# Patient Record
Sex: MALE | Race: WHITE | NOT HISPANIC OR LATINO | Employment: FULL TIME | URBAN - METROPOLITAN AREA
[De-identification: names, ages, dates, MRNs, and addresses within clinical notes are randomized per-mention and may not be internally consistent; named-entity substitution may affect disease eponyms.]

---

## 2017-03-14 ENCOUNTER — OFFICE VISIT (OUTPATIENT)
Dept: URGENT CARE | Facility: CLINIC | Age: 37
End: 2017-03-14
Payer: COMMERCIAL

## 2017-03-14 VITALS
DIASTOLIC BLOOD PRESSURE: 80 MMHG | OXYGEN SATURATION: 98 % | SYSTOLIC BLOOD PRESSURE: 110 MMHG | HEART RATE: 86 BPM | WEIGHT: 141.8 LBS | BODY MASS INDEX: 21 KG/M2 | TEMPERATURE: 99 F | RESPIRATION RATE: 16 BRPM | HEIGHT: 69 IN

## 2017-03-14 DIAGNOSIS — R05.9 COUGH: ICD-10-CM

## 2017-03-14 DIAGNOSIS — J06.9 UPPER RESPIRATORY TRACT INFECTION, UNSPECIFIED TYPE: ICD-10-CM

## 2017-03-14 PROCEDURE — 99214 OFFICE O/P EST MOD 30 MIN: CPT | Performed by: PHYSICIAN ASSISTANT

## 2017-03-14 RX ORDER — AMOXICILLIN AND CLAVULANATE POTASSIUM 875; 125 MG/1; MG/1
1 TABLET, FILM COATED ORAL 2 TIMES DAILY
Qty: 20 TAB | Refills: 0 | Status: SHIPPED | OUTPATIENT
Start: 2017-03-14 | End: 2017-03-24

## 2017-03-14 ASSESSMENT — ENCOUNTER SYMPTOMS
COUGH: 0
ABDOMINAL PAIN: 0
WHEEZING: 0
VOMITING: 0
HEADACHES: 1
CHILLS: 0
SHORTNESS OF BREATH: 0
SPUTUM PRODUCTION: 0
DIARRHEA: 0
NAUSEA: 0
FEVER: 0
SORE THROAT: 1

## 2017-03-14 NOTE — MR AVS SNAPSHOT
"        Kush iJn   3/14/2017 1:00 PM   Office Visit   MRN: [id number]    Department:  [facility]   Dept Phone:  [phone]    Description:  Male : 1980   Provider:  Tomasz Kaminski PA-C           Reason for Visit     Sinus Problem x4days, sinus pressure, mucus, fever, chills, headache, body aches      Allergies as of 3/14/2017     No Known Allergies      You were diagnosed with     Cough   [786.2.ICD-9-CM]         Vital Signs     Blood Pressure Pulse Temperature Respirations Height Weight    110/80 mmHg 86 37.2 °C (99 °F) 16 1.753 m (5' 9.02\") 64.32 kg (141 lb 12.8 oz)    Body Mass Index Oxygen Saturation Smoking Status             20.93 kg/m2 98% Never Smoker          Basic Information     Date Of Birth Sex Race Ethnicity Preferred Language    1980 Male White Non- English      Health Maintenance        Date Due Completion Dates    IMM DTaP/Tdap/Td Vaccine (1 - Tdap) 2000 ---    IMM INFLUENZA (1) 2016 ---            Current Immunizations     No immunizations on file.      Below and/or attached are the medications your provider expects you to take. Review all of your home medications and newly ordered medications with your provider and/or pharmacist. Follow medication instructions as directed by your provider and/or pharmacist. Please keep your medication list with you and share with your provider. Update the information when medications are discontinued, doses are changed, or new medications (including over-the-counter products) are added; and carry medication information at all times in the event of emergency situations     Allergies:  No Known Allergies          Medications  Valid as of: 2017 -  1:37 PM    Generic Name Brand Name Tablet Size Instructions for use    Amoxicillin-Pot Clavulanate (Tab) AUGMENTIN 875-125 MG Take 1 Tab by mouth every 12 hours. Take with food.        Amoxicillin-Pot Clavulanate (Tab) AUGMENTIN 875-125 MG Take 1 Tab by " mouth 2 times a day for 10 days.        Azithromycin (Tab) ZITHROMAX 250 MG 2 tabs by mouth day 1, 1 tab by mouth days 2-5        GuaiFENesin (TABLET SR 12 HR) Guaifenesin 1200 MG Take 1 Tab by mouth 2 times a day.        Hydrocod Polst-Chlorphen Polst (Liquid CR) TUSSIONEX 10-8 MG/5ML Take 5 mL by mouth every 12 hours.        MethylPREDNISolone (Tab) MEDROL DOSPACK 4 MG Use taper dose maisha as directed        Mometasone Furoate (Suspension) NASONEX 50 MCG/ACT Spray 2 Sprays in nose every day. Two sprays each nostril once daily        Oxymetazoline HCl   Spray  in nose.        Phenylephrine-APAP-Guaifenesin   Take  by mouth.        .                 Medicines prescribed today were sent to:     [facility] #[id number] - [facility] [place] - [address]    [address] [place] [address]    Phone: [phone] Fax: [phone]    Open 24 Hours?: No      Medication refill instructions:       If your prescription bottle indicates you have medication refills left, it is not necessary to call your provider’s office. Please contact your pharmacy and they will refill your medication.    If your prescription bottle indicates you do not have any refills left, you may request refills at any time through one of the following ways: The online [employer] system (except Urgent Care), by calling your provider’s office, or by asking your pharmacy to contact your provider’s office with a refill request. Medication refills are processed only during regular business hours and may not be available until the next business day. Your provider may request additional information or to have a follow-up visit with you prior to refilling your medication.   *Please Note: Medication refills are assigned a new Rx number when refilled electronically. Your pharmacy may indicate that no refills were authorized even though a new prescription for the same medication is available at the pharmacy. Please request the medicine by name with the  pharmacy before contacting your provider for a refill.           3 day Blinds Access Code: [email or web address]  Expires: 4/13/2017  1:37 PM    3 day Blinds  A secure, online tool to manage your health information     [employer] 3 day Blinds® is a secure, online tool that connects you to your personalized health information from the privacy of your home -- day or night - making it very easy for you to manage your healthcare. Once the activation process is completed, you can even access your medical information using the 3 day Blinds yessica, which is available for free in the Apple Yessica store or Google Play store.     3 day Blinds provides the following levels of access (as shown below):   My Chart Features   [facility] Primary Care Doctor [facility]  Specialists [facility]  Urgent  Care [facility]  Primary Care  Doctor   Email your healthcare team securely and privately 24/7 X X X    Manage appointments: schedule your next appointment; view details of past/upcoming appointments X      Request prescription refills. X      View recent personal medical records, including lab and immunizations X X X X   View health record, including health history, allergies, medications X X X X   Read reports about your outpatient visits, procedures, consult and ER notes X X X X   See your discharge summary, which is a recap of your hospital and/or ER visit that includes your diagnosis, lab results, and care plan. X X       How to register for 3 day Blinds:  1. Go to  [email or web address].  2. Click on the Sign Up Now box, which takes you to the New Member Sign Up page. You will need to provide the following information:  a. Enter your 3 day Blinds Access Code exactly as it appears at the top of this page. (You will not need to use this code after you’ve completed the sign-up process. If you do not sign up before the expiration date, you must request a new code.)   b. Enter your date of birth.   c. Enter your home email address.   d. Click Submit, and follow the next screen’s  instructions.  3. Create a Seeqt ID. This will be your Seeqt login ID and cannot be changed, so think of one that is secure and easy to remember.  4. Create a Seeqt password. You can change your password at any time.  5. Enter your Password Reset Question and Answer. This can be used at a later time if you forget your password.   6. Enter your e-mail address. This allows you to receive e-mail notifications when new information is available in [employer].  7. Click Sign Up. You can now view your health information.    For assistance activating your [employer]Parenting account, call [phone]

## 2017-03-14 NOTE — PROGRESS NOTES
Subjective:      Kush Jin is a 37 y.o. male who presents with Sinus Problem            Sinus Problem  Associated symptoms include congestion (sinus press to eyes), headaches and a sore throat ( mild). Pertinent negatives include no chills, coughing, ear pain or shortness of breath.   4d of sinus press, fever/chills, myalgias, denies cough, last 24hr mild dry cough, c/o sorethroat - worse at night and in am, c/o ear press, c/o HA, eye press, PMH of sinusitis, denies nausea/vomiting/abdpain/diarrhea/rash. Childhood asthma, denies wheeze, remote pMH of bronchitis/strep, pneumonia ~10yrs ago. seasonal allerg. No flu shot.    Review of Systems   Constitutional: Negative for fever ( resolved) and chills.   HENT: Positive for congestion (sinus press to eyes) and sore throat ( mild). Negative for ear pain.    Respiratory: Negative for cough, sputum production, shortness of breath and wheezing.    Gastrointestinal: Negative for nausea, vomiting, abdominal pain and diarrhea.   Skin: Negative for rash.   Neurological: Positive for headaches.   Endo/Heme/Allergies: Positive for environmental allergies.       PMH:  has no past medical history of Diabetes, ASTHMA, GERD (gastroesophageal reflux disease), Meningitis, Migraine, or Urinary tract infection, site not specified.  MEDS:   Current outpatient prescriptions:   •  Phenylephrine-APAP-Guaifenesin (MUCINEX FAST-MAX COLD & SINUS PO), Take  by mouth., Disp: , Rfl:   •  methylPREDNISolone (MEDROL DOSPACK) 4 MG TABS, Use taper dose maisha as directed, Disp: 21 Tab, Rfl: 0  •  hydrocod polst-chlorphen polst (TUSSIONEX) 10-8 MG/5ML LQCR, Take 5 mL by mouth every 12 hours., Disp: 140 mL, Rfl: 0  •  Oxymetazoline HCl (AFRIN 12 HOUR NA), Spray  in nose., Disp: , Rfl:   •  azithromycin (ZITHROMAX) 250 MG TABS, 2 tabs by mouth day 1, 1 tab by mouth days 2-5, Disp: 6 Tab, Rfl: 0  •  Guaifenesin (MUCINEX MAXIMUM STRENGTH) 1200 MG TB12, Take 1 Tab by mouth 2 times a day., Disp: 15  "Tab, Rfl: 1  •  amoxicillin-clavulanate (AUGMENTIN) 875-125 MG TABS, Take 1 Tab by mouth every 12 hours. Take with food., Disp: 20 Each, Rfl: 0  •  mometasone (NASONEX) 50 MCG/ACT nasal spray, Spray 2 Sprays in nose every day. Two sprays each nostril once daily, Disp: 1 Inhaler, Rfl: 0  ALLERGIES: No Known Allergies  SURGHX: No past surgical history on file.  SOCHX:  reports that he has never smoked. He does not have any smokeless tobacco history on file.  FH: Family history was reviewed, no pertinent findings to report    I have worn a mask for the entire encounter with this patient.      Objective:     /80 mmHg  Pulse 86  Temp(Src) 37.2 °C (99 °F)  Resp 16  Ht 1.753 m (5' 9.02\")  Wt 64.32 kg (141 lb 12.8 oz)  BMI 20.93 kg/m2  SpO2 98%     Physical Exam   Constitutional: He is oriented to person, place, and time. He appears well-developed and well-nourished. No distress.   HENT:   Head: Normocephalic and atraumatic.   Right Ear: External ear and ear canal normal. Tympanic membrane is bulging. Tympanic membrane is not erythematous.   Left Ear: External ear and ear canal normal. Tympanic membrane is bulging. Tympanic membrane is not erythematous.   Nose: Right sinus exhibits no maxillary sinus tenderness and no frontal sinus tenderness. Left sinus exhibits no maxillary sinus tenderness and no frontal sinus tenderness.   Mouth/Throat: Uvula is midline and mucous membranes are normal. Posterior oropharyngeal erythema ( PND) present. No oropharyngeal exudate, posterior oropharyngeal edema or tonsillar abscesses.   Eyes: Conjunctivae are normal. Right eye exhibits no discharge. Left eye exhibits no discharge. No scleral icterus.   Neck: Neck supple.   Pulmonary/Chest: Effort normal and breath sounds normal. No respiratory distress. He has no decreased breath sounds. He has no wheezes. He has no rhonchi. He has no rales.   Musculoskeletal: Normal range of motion.   Neurological: He is alert and oriented to " person, place, and time. Coordination normal.   Skin: Skin is warm and dry. He is not diaphoretic. No pallor.   Psychiatric: He has a normal mood and affect.   Nursing note and vitals reviewed.              Assessment/Plan:     1. Upper respiratory tract infection, unspecified type  Supportive care is reviewed with patient/caregiver - recommend to push PO fluids and electrolytes, Nsaids/tylenol, netti pot/saline irrig, humidifier in home, flonase, ponaris, antihistamines, we discuss likely viral URI at this point and focus on supportive/symptom care, with pt's hx of eye pressure w/ sinusitis will send w/ contingent  Contingent antibiotic prescription given to patient to fill upon meeting criteria of guidelines discussed.   If filling,  take full course of Rx, take with probiotics, observe for resolution  Return to clinic with lack of resolution or progression of symptoms.      2. Cough    - amoxicillin-clavulanate (AUGMENTIN) 875-125 MG Tab; Take 1 Tab by mouth 2 times a day for 10 days.  Dispense: 20 Tab; Refill: 0

## 2020-03-13 ENCOUNTER — APPOINTMENT (OUTPATIENT)
Dept: RADIOLOGY | Facility: MEDICAL CENTER | Age: 40
End: 2020-03-13
Attending: EMERGENCY MEDICINE
Payer: COMMERCIAL

## 2020-03-13 ENCOUNTER — HOSPITAL ENCOUNTER (EMERGENCY)
Facility: MEDICAL CENTER | Age: 40
End: 2020-03-13
Attending: EMERGENCY MEDICINE
Payer: COMMERCIAL

## 2020-03-13 VITALS
HEART RATE: 75 BPM | DIASTOLIC BLOOD PRESSURE: 90 MMHG | TEMPERATURE: 98.6 F | RESPIRATION RATE: 18 BRPM | BODY MASS INDEX: 20.77 KG/M2 | HEIGHT: 69 IN | WEIGHT: 140.21 LBS | SYSTOLIC BLOOD PRESSURE: 126 MMHG | OXYGEN SATURATION: 96 %

## 2020-03-13 DIAGNOSIS — R07.9 ACUTE CHEST PAIN: ICD-10-CM

## 2020-03-13 LAB
ANION GAP SERPL CALC-SCNC: 11 MMOL/L (ref 7–16)
BASOPHILS # BLD AUTO: 0.4 % (ref 0–1.8)
BASOPHILS # BLD: 0.02 K/UL (ref 0–0.12)
BUN SERPL-MCNC: 11 MG/DL (ref 8–22)
CALCIUM SERPL-MCNC: 9.4 MG/DL (ref 8.4–10.2)
CHLORIDE SERPL-SCNC: 103 MMOL/L (ref 96–112)
CO2 SERPL-SCNC: 27 MMOL/L (ref 20–33)
CREAT SERPL-MCNC: 0.81 MG/DL (ref 0.5–1.4)
EKG IMPRESSION: NORMAL
EOSINOPHIL # BLD AUTO: 0.04 K/UL (ref 0–0.51)
EOSINOPHIL NFR BLD: 0.9 % (ref 0–6.9)
ERYTHROCYTE [DISTWIDTH] IN BLOOD BY AUTOMATED COUNT: 36.4 FL (ref 35.9–50)
GLUCOSE SERPL-MCNC: 106 MG/DL (ref 65–99)
HCT VFR BLD AUTO: 49 % (ref 42–52)
HGB BLD-MCNC: 17.2 G/DL (ref 14–18)
IMM GRANULOCYTES # BLD AUTO: 0.01 K/UL (ref 0–0.11)
IMM GRANULOCYTES NFR BLD AUTO: 0.2 % (ref 0–0.9)
LYMPHOCYTES # BLD AUTO: 1.29 K/UL (ref 1–4.8)
LYMPHOCYTES NFR BLD: 28.5 % (ref 22–41)
MCH RBC QN AUTO: 30.8 PG (ref 27–33)
MCHC RBC AUTO-ENTMCNC: 35.1 G/DL (ref 33.7–35.3)
MCV RBC AUTO: 87.8 FL (ref 81.4–97.8)
MONOCYTES # BLD AUTO: 0.51 K/UL (ref 0–0.85)
MONOCYTES NFR BLD AUTO: 11.3 % (ref 0–13.4)
NEUTROPHILS # BLD AUTO: 2.65 K/UL (ref 1.82–7.42)
NEUTROPHILS NFR BLD: 58.7 % (ref 44–72)
NRBC # BLD AUTO: 0 K/UL
NRBC BLD-RTO: 0 /100 WBC
PLATELET # BLD AUTO: 196 K/UL (ref 164–446)
PMV BLD AUTO: 10.1 FL (ref 9–12.9)
POTASSIUM SERPL-SCNC: 4.2 MMOL/L (ref 3.6–5.5)
RBC # BLD AUTO: 5.58 M/UL (ref 4.7–6.1)
SODIUM SERPL-SCNC: 141 MMOL/L (ref 135–145)
TROPONIN T SERPL-MCNC: <6 NG/L (ref 6–19)
WBC # BLD AUTO: 4.5 K/UL (ref 4.8–10.8)

## 2020-03-13 PROCEDURE — 93005 ELECTROCARDIOGRAM TRACING: CPT | Performed by: EMERGENCY MEDICINE

## 2020-03-13 PROCEDURE — 71046 X-RAY EXAM CHEST 2 VIEWS: CPT

## 2020-03-13 PROCEDURE — 80048 BASIC METABOLIC PNL TOTAL CA: CPT

## 2020-03-13 PROCEDURE — 85025 COMPLETE CBC W/AUTO DIFF WBC: CPT

## 2020-03-13 PROCEDURE — 36415 COLL VENOUS BLD VENIPUNCTURE: CPT

## 2020-03-13 PROCEDURE — 99284 EMERGENCY DEPT VISIT MOD MDM: CPT

## 2020-03-13 PROCEDURE — 84484 ASSAY OF TROPONIN QUANT: CPT

## 2020-03-13 PROCEDURE — 93005 ELECTROCARDIOGRAM TRACING: CPT

## 2020-03-13 NOTE — ED PROVIDER NOTES
ED Provider Note    CHIEF COMPLAINT  Chief Complaint   Patient presents with   • Chest Pain     chest pain x 2 weeks  Fatigue x one week        HPI  Kush Jin is a 40 y.o. male who presents to the emergency department with a chief complaint of chest pain.  The patient localizes the pain to the left side of the chest.  He feels like someone is poking him with a pen.  The pain comes on intermittently.  Last about 30 to 45 seconds.  There are no alleviating or exacerbating factors.  No other associated symptoms.  No cough, no fevers, no hemoptysis.  No leg pain or swelling.    REVIEW OF SYSTEMS  See HPI for further details. All other systems are negative.     PAST MEDICAL HISTORY  History reviewed. No pertinent past medical history.    FAMILY HISTORY  History reviewed. No pertinent family history.    SOCIAL HISTORY  Social History     Socioeconomic History   • Marital status:      Spouse name: Not on file   • Number of children: Not on file   • Years of education: Not on file   • Highest education level: Not on file   Occupational History   • Not on file   Social Needs   • Financial resource strain: Not on file   • Food insecurity     Worry: Not on file     Inability: Not on file   • Transportation needs     Medical: Not on file     Non-medical: Not on file   Tobacco Use   • Smoking status: Never Smoker   Substance and Sexual Activity   • Alcohol use: Yes   • Drug use: Never   • Sexual activity: Not on file   Lifestyle   • Physical activity     Days per week: Not on file     Minutes per session: Not on file   • Stress: Not on file   Relationships   • Social connections     Talks on phone: Not on file     Gets together: Not on file     Attends Latter day service: Not on file     Active member of club or organization: Not on file     Attends meetings of clubs or organizations: Not on file     Relationship status: Not on file   • Intimate partner violence     Fear of current or ex partner: Not on file  "    Emotionally abused: Not on file     Physically abused: Not on file     Forced sexual activity: Not on file   Other Topics Concern   • Not on file   Social History Narrative   • Not on file       SURGICAL HISTORY  History reviewed. No pertinent surgical history.    CURRENT MEDICATIONS  Home Medications     Reviewed by Tish Cruz (Pharmacy Tech) on 03/13/20 at 1541  Med List Status: Complete   Medication Last Dose Status        Patient Miguel Taking any Medications                       ALLERGIES  No Known Allergies    PHYSICAL EXAM  VITAL SIGNS: /86   Pulse 76   Temp 37.2 °C (98.9 °F) (Temporal)   Resp 19   Ht 1.753 m (5' 9\")   Wt 63.6 kg (140 lb 3.4 oz)   SpO2 95%   BMI 20.71 kg/m²    Constitutional: Well developed, Well nourished, No acute distress, Non-toxic appearance.   HENT: Normocephalic, Atraumatic, Bilateral external ears normal, Oropharynx moist, No oral exudates, Nose normal.   Eyes: PERRLA, EOMI, Conjunctiva normal, No discharge.   Neck: Normal range of motion, No tenderness, Supple, No stridor.   Cardiovascular: Regular rate and rhythm, no audible murmur  Thorax & Lungs: Clear to auscultation bilaterally.  No rales, rhonchi, or wheezing.  Abdomen: Bowel sounds normal, Soft, No tenderness, No masses, No pulsatile masses.   Skin: Warm, Dry, No erythema, No rash.   Back: No tenderness, No CVA tenderness.   Extremities: Intact distal pulses, No tenderness, No cyanosis, No clubbing.  No edema.  No calf tenderness.  Neurologic: Alert & oriented x 3, Normal motor function, Normal sensory function, No focal deficits noted.     EKG      RADIOLOGY/PROCEDURES  DX-CHEST-2 VIEWS   Final Result      Negative two views of the chest.      DX-CHEST-2 VIEWS   Final Result   Addendum 1 of 1   I was just notified by the x-ray technologist that the chest x-ray    obtained and listed under this patient's medical record number and name is    from a different person.      Therefore, disregard the " above report and this patient will be imaged and    a report generated for those images      Findings were discussed with MINA MOBLEY on 3/13/2020 4:26 PM.      Final        Results for orders placed or performed during the hospital encounter of 03/13/20   CBC WITH DIFFERENTIAL   Result Value Ref Range    WBC 4.5 (L) 4.8 - 10.8 K/uL    RBC 5.58 4.70 - 6.10 M/uL    Hemoglobin 17.2 14.0 - 18.0 g/dL    Hematocrit 49.0 42.0 - 52.0 %    MCV 87.8 81.4 - 97.8 fL    MCH 30.8 27.0 - 33.0 pg    MCHC 35.1 33.7 - 35.3 g/dL    RDW 36.4 35.9 - 50.0 fL    Platelet Count 196 164 - 446 K/uL    MPV 10.1 9.0 - 12.9 fL    Neutrophils-Polys 58.70 44.00 - 72.00 %    Lymphocytes 28.50 22.00 - 41.00 %    Monocytes 11.30 0.00 - 13.40 %    Eosinophils 0.90 0.00 - 6.90 %    Basophils 0.40 0.00 - 1.80 %    Immature Granulocytes 0.20 0.00 - 0.90 %    Nucleated RBC 0.00 /100 WBC    Neutrophils (Absolute) 2.65 1.82 - 7.42 K/uL    Lymphs (Absolute) 1.29 1.00 - 4.80 K/uL    Monos (Absolute) 0.51 0.00 - 0.85 K/uL    Eos (Absolute) 0.04 0.00 - 0.51 K/uL    Baso (Absolute) 0.02 0.00 - 0.12 K/uL    Immature Granulocytes (abs) 0.01 0.00 - 0.11 K/uL    NRBC (Absolute) 0.00 K/uL   BASIC METABOLIC PANEL   Result Value Ref Range    Sodium 141 135 - 145 mmol/L    Potassium 4.2 3.6 - 5.5 mmol/L    Chloride 103 96 - 112 mmol/L    Co2 27 20 - 33 mmol/L    Glucose 106 (H) 65 - 99 mg/dL    Bun 11 8 - 22 mg/dL    Creatinine 0.81 0.50 - 1.40 mg/dL    Calcium 9.4 8.4 - 10.2 mg/dL    Anion Gap 11.0 7.0 - 16.0   TROPONIN   Result Value Ref Range    Troponin T <6 6 - 19 ng/L   ESTIMATED GFR   Result Value Ref Range    GFR If African American >60 >60 mL/min/1.73 m 2    GFR If Non African American >60 >60 mL/min/1.73 m 2   EKG   Result Value Ref Range    Report       [facility] Emergency Dept.    Test Date:  2020-03-13  Pt Name:    LADI HICKSOWELL             Department: E[facility]  MRN:        [id number]                      Room:  Gender:     Male                          Technician: BOB  :        1980                   Requested By:ER TRIAGE PROTOCOL  Order #:    [id number]                    Reading MD: MINA MOBLEY MD    Measurements  Intervals                                Axis  Rate:       69                           P:          40  MT:         181                          QRS:        40  QRSD:       80                           T:          42  QT:         367  QTc:        393    Interpretive Statements  Sinus rhythm  No previous ECG available for comparison  Electronically Signed On 3- 14:27:10 PDT by MINA MOBLEY MD           COURSE & MEDICAL DECISION MAKING  Pertinent Labs & Imaging studies reviewed. (See chart for details)    Patient presents today with left-sided chest pain.  Atypical chest pain.  Fairly nonspecific.  EKG is normal.  Chest x-ray is normal.  Laboratory findings are remarkable for normal white blood cell count.  Normal troponin.    Patient presents today with an atypical nonspecific chest pain syndrome.  Emergency department evaluation is reassuring.  Likely mechanical chest wall pain.  Recommended nonsteroidal over-the-counter anti-inflammatories for pain control.  Discharged home in stable condition.  Primary care follow-up.    The patient will return for new or worsening symptoms and is stable at the time of discharge.    The patient is referred to a primary physician for blood pressure management, diabetic screening, and for all other preventative health concerns.    DISPOSITION:  Patient will be discharged home in stable condition.    FOLLOW UP:  Torey VENCES M.D.  [address]  [place] [id number] [phone]    Schedule an appointment as soon as possible for a visit       [facility], Emergency Dept  [address]  [place] [id number] [phone]  Schedule an appointment as soon as possible for a visit         OUTPATIENT MEDICATIONS:  New Prescriptions     No medications on file       FINAL IMPRESSION  1. Acute chest pain              Electronically signed by: Ze Leary M.D., 3/13/2020 5:28 PM

## 2020-03-14 NOTE — ED NOTES
Discharge instructions provided.  Pt verbalized the understanding of discharge instructions to follow up with PCP and to return to ER if condition worsens.  Pt ambulated out of ER without difficulty.  RX 0

## 2020-03-14 NOTE — ED NOTES
Pt given written and oral dc instructions. Pt verbalized understanding of all instructions given. All questions answered. VSS. IV removed. Pt given fu instructions and educated on s/s of when to return to the ER. Pt amb independently upon time of dc in stable condition.

## 2021-04-07 ENCOUNTER — IMMUNIZATION (OUTPATIENT)
Dept: FAMILY PLANNING/WOMEN'S HEALTH CLINIC | Facility: IMMUNIZATION CENTER | Age: 41
End: 2021-04-07
Payer: COMMERCIAL

## 2021-04-07 DIAGNOSIS — Z23 ENCOUNTER FOR VACCINATION: Primary | ICD-10-CM

## 2021-04-07 PROCEDURE — 91300 PFIZER SARS-COV-2 VACCINE: CPT

## 2021-04-07 PROCEDURE — 0001A PFIZER SARS-COV-2 VACCINE: CPT

## 2021-04-29 ENCOUNTER — IMMUNIZATION (OUTPATIENT)
Dept: FAMILY PLANNING/WOMEN'S HEALTH CLINIC | Facility: IMMUNIZATION CENTER | Age: 41
End: 2021-04-29
Payer: COMMERCIAL

## 2021-04-29 DIAGNOSIS — Z23 ENCOUNTER FOR VACCINATION: Primary | ICD-10-CM

## 2021-04-29 PROCEDURE — 91300 PFIZER SARS-COV-2 VACCINE: CPT

## 2021-04-29 PROCEDURE — 0002A PFIZER SARS-COV-2 VACCINE: CPT

## 2021-06-07 ENCOUNTER — APPOINTMENT (OUTPATIENT)
Dept: RADIOLOGY | Facility: MEDICAL CENTER | Age: 41
End: 2021-06-07
Attending: EMERGENCY MEDICINE
Payer: COMMERCIAL

## 2021-06-07 ENCOUNTER — HOSPITAL ENCOUNTER (OUTPATIENT)
Facility: MEDICAL CENTER | Age: 41
End: 2021-06-07
Attending: EMERGENCY MEDICINE | Admitting: STUDENT IN AN ORGANIZED HEALTH CARE EDUCATION/TRAINING PROGRAM
Payer: COMMERCIAL

## 2021-06-07 ENCOUNTER — OFFICE VISIT (OUTPATIENT)
Dept: URGENT CARE | Facility: CLINIC | Age: 41
End: 2021-06-07
Payer: COMMERCIAL

## 2021-06-07 VITALS
HEIGHT: 69 IN | HEART RATE: 89 BPM | RESPIRATION RATE: 16 BRPM | DIASTOLIC BLOOD PRESSURE: 91 MMHG | BODY MASS INDEX: 21.27 KG/M2 | TEMPERATURE: 98.9 F | SYSTOLIC BLOOD PRESSURE: 126 MMHG | OXYGEN SATURATION: 95 %

## 2021-06-07 VITALS
HEIGHT: 69 IN | SYSTOLIC BLOOD PRESSURE: 128 MMHG | BODY MASS INDEX: 21.33 KG/M2 | OXYGEN SATURATION: 93 % | HEART RATE: 104 BPM | DIASTOLIC BLOOD PRESSURE: 70 MMHG | WEIGHT: 144 LBS | TEMPERATURE: 98 F | RESPIRATION RATE: 20 BRPM

## 2021-06-07 DIAGNOSIS — R07.89 OTHER CHEST PAIN: ICD-10-CM

## 2021-06-07 DIAGNOSIS — R06.02 SHORTNESS OF BREATH: ICD-10-CM

## 2021-06-07 DIAGNOSIS — R07.1 CHEST PAIN ON BREATHING: ICD-10-CM

## 2021-06-07 DIAGNOSIS — R94.31 ABNORMAL EKG: ICD-10-CM

## 2021-06-07 DIAGNOSIS — R94.31 ST ELEVATION: ICD-10-CM

## 2021-06-07 PROBLEM — R07.9 PAIN IN THE CHEST: Status: ACTIVE | Noted: 2021-06-07

## 2021-06-07 LAB
ALBUMIN SERPL BCP-MCNC: 4.5 G/DL (ref 3.2–4.9)
ALBUMIN/GLOB SERPL: 1.6 G/DL
ALP SERPL-CCNC: 112 U/L (ref 30–99)
ALT SERPL-CCNC: 13 U/L (ref 2–50)
ANION GAP SERPL CALC-SCNC: 9 MMOL/L (ref 7–16)
AST SERPL-CCNC: 14 U/L (ref 12–45)
BASOPHILS # BLD AUTO: 0.3 % (ref 0–1.8)
BASOPHILS # BLD: 0.03 K/UL (ref 0–0.12)
BILIRUB SERPL-MCNC: 0.4 MG/DL (ref 0.1–1.5)
BUN SERPL-MCNC: 17 MG/DL (ref 8–22)
CALCIUM SERPL-MCNC: 9 MG/DL (ref 8.4–10.2)
CHLORIDE SERPL-SCNC: 102 MMOL/L (ref 96–112)
CO2 SERPL-SCNC: 27 MMOL/L (ref 20–33)
CREAT SERPL-MCNC: 0.93 MG/DL (ref 0.5–1.4)
D DIMER PPP IA.FEU-MCNC: <0.27 UG/ML (FEU) (ref 0–0.5)
EKG IMPRESSION: NORMAL
EKG IMPRESSION: NORMAL
EOSINOPHIL # BLD AUTO: 0.06 K/UL (ref 0–0.51)
EOSINOPHIL NFR BLD: 0.6 % (ref 0–6.9)
ERYTHROCYTE [DISTWIDTH] IN BLOOD BY AUTOMATED COUNT: 37 FL (ref 35.9–50)
FLUAV RNA SPEC QL NAA+PROBE: NEGATIVE
FLUBV RNA SPEC QL NAA+PROBE: NEGATIVE
GLOBULIN SER CALC-MCNC: 2.8 G/DL (ref 1.9–3.5)
GLUCOSE SERPL-MCNC: 102 MG/DL (ref 65–99)
HCT VFR BLD AUTO: 46.2 % (ref 42–52)
HGB BLD-MCNC: 16 G/DL (ref 14–18)
IMM GRANULOCYTES # BLD AUTO: 0.03 K/UL (ref 0–0.11)
IMM GRANULOCYTES NFR BLD AUTO: 0.3 % (ref 0–0.9)
LYMPHOCYTES # BLD AUTO: 1.5 K/UL (ref 1–4.8)
LYMPHOCYTES NFR BLD: 15 % (ref 22–41)
MCH RBC QN AUTO: 30.9 PG (ref 27–33)
MCHC RBC AUTO-ENTMCNC: 34.6 G/DL (ref 33.7–35.3)
MCV RBC AUTO: 89.4 FL (ref 81.4–97.8)
MONOCYTES # BLD AUTO: 1.15 K/UL (ref 0–0.85)
MONOCYTES NFR BLD AUTO: 11.5 % (ref 0–13.4)
NEUTROPHILS # BLD AUTO: 7.24 K/UL (ref 1.82–7.42)
NEUTROPHILS NFR BLD: 72.3 % (ref 44–72)
NRBC # BLD AUTO: 0 K/UL
NRBC BLD-RTO: 0 /100 WBC
PLATELET # BLD AUTO: 205 K/UL (ref 164–446)
PMV BLD AUTO: 9.7 FL (ref 9–12.9)
POTASSIUM SERPL-SCNC: 3.9 MMOL/L (ref 3.6–5.5)
PROT SERPL-MCNC: 7.3 G/DL (ref 6–8.2)
RBC # BLD AUTO: 5.17 M/UL (ref 4.7–6.1)
RSV RNA SPEC QL NAA+PROBE: NEGATIVE
SARS-COV-2 RNA RESP QL NAA+PROBE: NOTDETECTED
SODIUM SERPL-SCNC: 138 MMOL/L (ref 135–145)
SPECIMEN SOURCE: NORMAL
TROPONIN T SERPL-MCNC: <6 NG/L (ref 6–19)
TROPONIN T SERPL-MCNC: <6 NG/L (ref 6–19)
WBC # BLD AUTO: 10 K/UL (ref 4.8–10.8)

## 2021-06-07 PROCEDURE — G0378 HOSPITAL OBSERVATION PER HR: HCPCS

## 2021-06-07 PROCEDURE — 700111 HCHG RX REV CODE 636 W/ 250 OVERRIDE (IP): Performed by: EMERGENCY MEDICINE

## 2021-06-07 PROCEDURE — 99214 OFFICE O/P EST MOD 30 MIN: CPT | Performed by: NURSE PRACTITIONER

## 2021-06-07 PROCEDURE — 0241U HCHG SARS-COV-2 COVID-19 NFCT DS RESP RNA 4 TRGT MIC: CPT

## 2021-06-07 PROCEDURE — 80053 COMPREHEN METABOLIC PANEL: CPT

## 2021-06-07 PROCEDURE — 85379 FIBRIN DEGRADATION QUANT: CPT

## 2021-06-07 PROCEDURE — 96374 THER/PROPH/DIAG INJ IV PUSH: CPT

## 2021-06-07 PROCEDURE — 99284 EMERGENCY DEPT VISIT MOD MDM: CPT

## 2021-06-07 PROCEDURE — 85025 COMPLETE CBC W/AUTO DIFF WBC: CPT

## 2021-06-07 PROCEDURE — C9803 HOPD COVID-19 SPEC COLLECT: HCPCS | Performed by: EMERGENCY MEDICINE

## 2021-06-07 PROCEDURE — 700102 HCHG RX REV CODE 250 W/ 637 OVERRIDE(OP)

## 2021-06-07 PROCEDURE — 71045 X-RAY EXAM CHEST 1 VIEW: CPT

## 2021-06-07 PROCEDURE — 93005 ELECTROCARDIOGRAM TRACING: CPT | Performed by: EMERGENCY MEDICINE

## 2021-06-07 PROCEDURE — A9270 NON-COVERED ITEM OR SERVICE: HCPCS

## 2021-06-07 PROCEDURE — 36415 COLL VENOUS BLD VENIPUNCTURE: CPT

## 2021-06-07 PROCEDURE — 84484 ASSAY OF TROPONIN QUANT: CPT | Mod: 91

## 2021-06-07 RX ORDER — AMOXICILLIN 250 MG
2 CAPSULE ORAL 2 TIMES DAILY
Status: DISCONTINUED | OUTPATIENT
Start: 2021-06-08 | End: 2021-06-07 | Stop reason: HOSPADM

## 2021-06-07 RX ORDER — PROCHLORPERAZINE EDISYLATE 5 MG/ML
5-10 INJECTION INTRAMUSCULAR; INTRAVENOUS EVERY 4 HOURS PRN
Status: DISCONTINUED | OUTPATIENT
Start: 2021-06-07 | End: 2021-06-07 | Stop reason: HOSPADM

## 2021-06-07 RX ORDER — ONDANSETRON 2 MG/ML
4 INJECTION INTRAMUSCULAR; INTRAVENOUS EVERY 4 HOURS PRN
Status: DISCONTINUED | OUTPATIENT
Start: 2021-06-07 | End: 2021-06-07 | Stop reason: HOSPADM

## 2021-06-07 RX ORDER — ACETAMINOPHEN 325 MG/1
650 TABLET ORAL EVERY 6 HOURS PRN
Status: DISCONTINUED | OUTPATIENT
Start: 2021-06-07 | End: 2021-06-07 | Stop reason: HOSPADM

## 2021-06-07 RX ORDER — ASPIRIN 81 MG/1
TABLET, CHEWABLE ORAL
Status: COMPLETED
Start: 2021-06-07 | End: 2021-06-07

## 2021-06-07 RX ORDER — PROMETHAZINE HYDROCHLORIDE 25 MG/1
12.5-25 TABLET ORAL EVERY 4 HOURS PRN
Status: DISCONTINUED | OUTPATIENT
Start: 2021-06-07 | End: 2021-06-07 | Stop reason: HOSPADM

## 2021-06-07 RX ORDER — LABETALOL HYDROCHLORIDE 5 MG/ML
10 INJECTION, SOLUTION INTRAVENOUS EVERY 4 HOURS PRN
Status: DISCONTINUED | OUTPATIENT
Start: 2021-06-07 | End: 2021-06-07 | Stop reason: HOSPADM

## 2021-06-07 RX ORDER — KETOROLAC TROMETHAMINE 30 MG/ML
30 INJECTION, SOLUTION INTRAMUSCULAR; INTRAVENOUS ONCE
Status: COMPLETED | OUTPATIENT
Start: 2021-06-07 | End: 2021-06-07

## 2021-06-07 RX ORDER — ONDANSETRON 4 MG/1
4 TABLET, ORALLY DISINTEGRATING ORAL EVERY 4 HOURS PRN
Status: DISCONTINUED | OUTPATIENT
Start: 2021-06-07 | End: 2021-06-07 | Stop reason: HOSPADM

## 2021-06-07 RX ORDER — PROMETHAZINE HYDROCHLORIDE 25 MG/1
12.5-25 SUPPOSITORY RECTAL EVERY 4 HOURS PRN
Status: DISCONTINUED | OUTPATIENT
Start: 2021-06-07 | End: 2021-06-07 | Stop reason: HOSPADM

## 2021-06-07 RX ORDER — IBUPROFEN 200 MG
200 TABLET ORAL EVERY 6 HOURS PRN
COMMUNITY

## 2021-06-07 RX ORDER — ASPIRIN 81 MG/1
324 TABLET, CHEWABLE ORAL DAILY
Status: DISCONTINUED | OUTPATIENT
Start: 2021-06-08 | End: 2021-06-07

## 2021-06-07 RX ORDER — POLYETHYLENE GLYCOL 3350 17 G/17G
1 POWDER, FOR SOLUTION ORAL
Status: DISCONTINUED | OUTPATIENT
Start: 2021-06-07 | End: 2021-06-07 | Stop reason: HOSPADM

## 2021-06-07 RX ORDER — BISACODYL 10 MG
10 SUPPOSITORY, RECTAL RECTAL
Status: DISCONTINUED | OUTPATIENT
Start: 2021-06-07 | End: 2021-06-07 | Stop reason: HOSPADM

## 2021-06-07 RX ADMIN — KETOROLAC TROMETHAMINE 30 MG: 30 INJECTION, SOLUTION INTRAMUSCULAR; INTRAVENOUS at 17:57

## 2021-06-07 RX ADMIN — ASPIRIN 81 MG CHEWABLE TABLET 324 MG: 81 TABLET CHEWABLE at 17:56

## 2021-06-07 RX ADMIN — ASPIRIN 324 MG: 81 TABLET, CHEWABLE ORAL at 17:56

## 2021-06-07 ASSESSMENT — ENCOUNTER SYMPTOMS
BACK PAIN: 0
CHILLS: 0
ABDOMINAL PAIN: 0
SORE THROAT: 0
EYE PAIN: 0
COUGH: 0
WEAKNESS: 0
CONSTIPATION: 0
COUGH: 0
ORTHOPNEA: 0
CHILLS: 0
NAUSEA: 0
NERVOUS/ANXIOUS: 0
WHEEZING: 0
MYALGIAS: 0
SHORTNESS OF BREATH: 1
PALPITATIONS: 0
VOMITING: 0
VOMITING: 0
HEADACHES: 0
NAUSEA: 0
DIARRHEA: 0
SHORTNESS OF BREATH: 0
ABDOMINAL PAIN: 0
FEVER: 0
FEVER: 0
DIZZINESS: 0

## 2021-06-07 ASSESSMENT — PAIN SCALES - GENERAL: PAINLEVEL: NO PAIN

## 2021-06-08 ENCOUNTER — PATIENT OUTREACH (OUTPATIENT)
Dept: HEALTH INFORMATION MANAGEMENT | Facility: OTHER | Age: 41
End: 2021-06-08

## 2021-06-08 NOTE — PROGRESS NOTES
DIANNE Shelton reached out to both [facility] and [place] cardiology offices. Both requested that pt has a referral to cardiology, pt does not. CHW contacted pt to inform him that he will have to see his pcp first to receive a referral. CHW offered to assist scheduling an apt with pt's pcp. Pt declined stating that he has already called the office and is waiting for a call back. Pt requested the phone number to [facility], stating that he'll give them a call if his pcp says its okay to wait until late July to be seen. CHW provided pt with contact info to [facility].

## 2021-06-08 NOTE — ED NOTES
[facility] has no telemetry beds available and is declining patient due to no capacity at this time. Transfer center will check with [facility].

## 2021-06-08 NOTE — ED NOTES
Charge RN and bed control are working on transferring pt to [facility] for further care and treatment  Pt is aware

## 2021-06-08 NOTE — DISCHARGE INSTRUCTIONS
Chest Pain, Nonspecific  It is often hard to give a specific diagnosis for the cause of chest pain. There is always a chance that your pain could be related to something serious, like a heart attack or a blood clot in the lungs. You need to follow up with your caregiver for further evaluation. More lab tests or other studies such as X-rays, electrocardiography, stress testing, or cardiac imaging may be needed to find the cause of your pain.  Most of the time, nonspecific chest pain improves within 2 to 3 days with rest and mild pain medicine. For the next few days, avoid physical exertion or activities that bring on pain. Do not smoke. Avoid drinking alcohol. Call your caregiver for routine follow-up as advised.   SEEK IMMEDIATE MEDICAL CARE IF:  · You develop increased chest pain or pain that radiates to the arm, neck, jaw, back, or abdomen.   · You develop shortness of breath, increased coughing, or you start coughing up blood.   · You have severe back or abdominal pain, nausea, or vomiting.   · You develop severe weakness, fainting, fever, or chills.   Document Released: 12/18/2006 Document Revised: 03/11/2013 Document Reviewed: 06/06/2008  [employer]® Patient Information ©2013 [employer].      Cardiac Biomarkers  Cardiac biomarkers are enzymes, proteins, and hormones that are associated with heart function, damage or failure. Some of the tests are specific for the heart while others are also elevated with skeletal muscle damage. Cardiac biomarkers are used for diagnostic and prognostic purposes and are frequently ordered by caregivers when someone comes into the Emergency Room complaining of symptoms, such as chest pain, pressure, nausea, and shortness of breath. These tests are ordered, along with other laboratory and non-laboratory tests, to detect heart failure (which is often a chronic, progressive condition affecting the ability of the heart to fill with blood and pump efficiently) and the acute coronary  syndromes (ACS) as well as to help determine prognosis for people who have had a heart attack. ACS is a group of symptoms that reflect a sudden decrease in the amount of blood and oxygen, also termed 'ischemia,' reaching the heart. This decrease is frequently due to either a narrowing of the coronary arteries (atherosclerosis or vessel spasm) or unstable plaques, which can cause a blood clot (thrombus) and blockage of blood flow. If the oxygen supply is low, it can cause angina (pain); if blood flow is reduced, it can cause death of heart cells (called myocardial infarction or heart attack) and can lead to death of the affected heart muscle cells and to permanent damage and scarring of the heart.   The goal with cardiac biomarkers is to be able to detect the presence and severity of an acute heart condition as soon as possible so that appropriate treatment can be initiated.   There are only a few cardiac biomarkers that are being routinely used by physicians. Some have been phased out because they are not as specific as the marker of choice  troponin. Many other potential cardiac biomarkers are still being researched but their clinical utility has yet to be established.   Note: Cardiac biomarkers are not the same tests as those that are used to screen the general healthy population for their risk of developing heart disease. Those can be found under Cardiac Risk Assessment.  LABORATORY TESTS CURRENT CARDIAC BIOMARKERS   · CK and CK-MB  · BNP or (NT-proBNP)  · Troponin  · Myoglobin (not always used; sometimes ordered with troponin)  MORE GENERAL TESTS FREQUENTLY ORDERED ALONG WITH CARDIAC BIOMARKERS   · Blood Gases  · CMP  · BMP  · Electrolytes  · CBC  ON THE HORIZON  Ischemia modified albumin (VERÓNICA)  Test has received FDA approval for use with troponin and electrocardiogram to rule out acute coronary syndrome (ACS) in patients with chest pain. May become useful for identifying patients at higher risk of heart attack  and potentially could replace myoglobin one day.   NON-LABORATORY TESTS  These tests allow caregivers to look at the size, shape, and function of the heart as it is beating. They can be used to detect changes to the rhythm of the heart as well as to detect and evaluate damaged tissues and blocked arteries.   · EKG (ECG, electrocardiogram)  · Coronary angiography (or arteriography)  · Stress testing  · Nuclear scan  · ECG (echocardiogram)  · Chest X-ray  THE FOLLOWING SUMMARIZES CURRENTLY USED CARDIAC BIOMARKERS.  Marker: CK  · What: Enzyme that exists in three different isoforms  · Where Found: Heart, brain, and skeletal muscle  · What Indicates: Injury to muscle cells  · Time to Increase: 4 to 6 hours after injury, peaks in 18 to 24 hours  · Time back to Normal: Normal in 48 to 72 hours, unless due to continuing injury  · When/How Used: Being phased out, may be ordered prior to CK-MB  Marker: CK-MB  · What: Heart- related portion of total CK enzyme  · Where Found: Heart primarily, but also in skeletal muscle  · What Indicates: Injury (cell death) to heart  · Time to Increase: 4 to 6 hrs after heart attack, peaks in 12 to 20 hours  · Time back to Normal: Returns to normal in 24 to 48 hours unless new/continual damage  · When/How Used: Not as specific as Troponin for heart injury/attack, may be ordered when Troponin is not available, may be ordered to monitor new/continuing damage  Marker: Myoglobin  · What: Small oxygen-storing protein  · Where Found: Heart and other muscle cells  · What Indicates: Injury to heart or other muscle cells. Also elevated with kidney problems.  · Time to Increase: Starts to rise within 2 to 3 hours, peaks in 8 to 12 hours.  · Time back to Normal: Falls back to normal by about one day after injury occurred  · When/How Used: Ordered along with Troponin, helps diagnose heart injury/attack  Marker: Cardiac Troponin  · What: Components of a Regulatory protein complex. Two cardiac specific  isoforms: T and I  · Where Found: Heart muscle  · What Indicates: Heart injury/damage  · Time to Increase: 4 to 8 hours  · Time back to Normal: Remains elevated for 7 to 14 days  · When/How Used: Ordered to help assess prognosis and diagnose heart attack  Marker: LDH  · What: Enzyme  · Where Found: Almost all body tissues  · What Indicates: General marker of injury to cells  · When/How Used: Phased out, not specific  Marker: AST  · What: Enzyme  · Where Found: Almost all body tissues  · What Indicates: General marker of injury to cells  · When/How Used: Phased out, not specific  Marker: Hs-CRP  · What: Protein  · Where Found: Associated with athero-sclerosis  · What Indicates: Inflammatory process  · Time back to Normal: Elevated with inflammation  · When/How Used: May help determine prognosis of patients who have had heart attack  Marker: BNP  · What: Hormone  · Where Found: Heart's left ventricle  · What Indicates: Heart failure  · Time back to Normal: Elevation related to severity  · When/How Used: Help diagnose and evaluate heart failure, prognosis, and to monitor therapy  Document Released: 01/10/2006 Document Revised: 03/11/2013 Document Reviewed: 09/27/2006  [employer]® Patient Information ©2014 [employer].

## 2021-06-08 NOTE — ED TRIAGE NOTES
"Chief Complaint   Patient presents with   • Chest Pain     \"abnormal EKG\"   Chest pain that radiates to his left neck area, seen at Urgent Care and sent to the ED for further work up.  "

## 2021-06-08 NOTE — ASSESSMENT & PLAN NOTE
Patient presenting with chest pain suspect that this is musculoskeletal.  He denies any recent infections or heavy lifting or trauma.  Pain is not reproducible on palpation.  Cardiology consulted  EKG was slightly abnormal with mild elevations in V3 and V4.  Cardiology reports this is not a STEMI.  Admit to telemetry  Trend troponin  Echocardiogram  Stress test

## 2021-06-08 NOTE — H&P
"Hospital Medicine History & Physical Note    Date of Service  6/7/2021    Primary Care Physician  Torey VENCES M.D.    Consultants  Cardiology    Code Status  Full Code    Chief Complaint  Chief Complaint   Patient presents with   • Chest Pain     \"abnormal EKG\"       History of Presenting Illness  41 y.o. male who presented 6/7/2021 with no prior medical history who presents with chest pain and abnormal EKG.  Patient reports that he has been having chest pain for the last couple days.  He reports that the pain started in the center of his chest towards the bottom and has been increasing up he reports that the pain is worse when he takes a deep breath and feels that it improves when he takes shallow breaths.  He denies any recent illnesses, coughing, fevers, chills, shortness of breath, abdominal pain, nausea, vomiting.  The pain he describes is sharp and occurs at the same time when he develops pain towards his back.  He has never had any episodes like this in the past.  He denies any family history of heart attacks.  He does report that he has a brother that had an irregular heartbeat when he was younger.  Patient does not smoke.  No recent travel.    Review of Systems  Review of Systems   Constitutional: Negative for chills, fever and malaise/fatigue.   Respiratory: Negative for cough and shortness of breath.    Cardiovascular: Positive for chest pain. Negative for palpitations and leg swelling.   Gastrointestinal: Negative for abdominal pain, constipation, diarrhea, nausea and vomiting.   Genitourinary: Negative for dysuria.   Musculoskeletal: Positive for joint pain. Negative for back pain.   All other systems reviewed and are negative.      Past Medical History  Denies any medical problems    Surgical History  Patient has never had surgery in the past    Family History  Brother had an irregular heartbeat when he was younger    Social History   reports that he has never smoked. He has never used " smokeless tobacco. He reports previous alcohol use. He reports previous drug use.  Works in marketing    Allergies  No Known Allergies    Medications  Prior to Admission Medications   Prescriptions Last Dose Informant Patient Reported? Taking?   ibuprofen (MOTRIN) 200 MG Tab   Yes No   Sig: Take 200 mg by mouth every 6 hours as needed.      Facility-Administered Medications: None       Physical Exam  Temp:  [37.2 °C (98.9 °F)] 37.2 °C (98.9 °F)  Pulse:  [89-94] 91  Resp:  [16] 16  BP: (122-140)/(80-85) 122/82  SpO2:  [95 %-96 %] 95 %    Physical Exam  Vitals and nursing note reviewed.   Constitutional:       Appearance: Normal appearance.   HENT:      Head: Normocephalic and atraumatic.   Eyes:      General: No scleral icterus.        Right eye: No discharge.         Left eye: No discharge.   Cardiovascular:      Rate and Rhythm: Normal rate and regular rhythm.      Heart sounds: Normal heart sounds. No murmur heard.     Pulmonary:      Effort: No respiratory distress.      Breath sounds: No wheezing or rales.   Chest:      Chest wall: No tenderness.   Abdominal:      General: Abdomen is flat. Bowel sounds are normal. There is no distension.      Tenderness: There is no abdominal tenderness. There is no guarding.   Musculoskeletal:         General: No tenderness.      Right lower leg: No edema.      Left lower leg: No edema.   Skin:     General: Skin is warm and dry.      Coloration: Skin is not jaundiced.   Neurological:      Mental Status: He is alert and oriented to person, place, and time.      Cranial Nerves: No cranial nerve deficit.   Psychiatric:         Mood and Affect: Mood normal.         Behavior: Behavior normal.         Judgment: Judgment normal.         Laboratory:  Recent Labs     06/07/21  1735   WBC 10.0   RBC 5.17   HEMOGLOBIN 16.0   HEMATOCRIT 46.2   MCV 89.4   MCH 30.9   MCHC 34.6   RDW 37.0   PLATELETCT 205   MPV 9.7     Recent Labs     06/07/21  1735   SODIUM 138   POTASSIUM 3.9   CHLORIDE 102    CO2 27   GLUCOSE 102*   BUN 17   CREATININE 0.93   CALCIUM 9.0     Recent Labs     06/07/21  1735   ALTSGPT 13   ASTSGOT 14   ALKPHOSPHAT 112*   TBILIRUBIN 0.4   GLUCOSE 102*         No results for input(s): NTPROBNP in the last 72 hours.      Recent Labs     06/07/21  1735   TROPONINT <6       Imaging:  DX-CHEST-PORTABLE (1 VIEW)   Final Result      No acute cardiopulmonary abnormality.      Cardiac Stress Test Treadmill without Images    (Results Pending)         Assessment/Plan:  I anticipate this patient is appropriate for observation status at this time.    * Pain in the chest  Assessment & Plan  Patient presenting with chest pain suspect that this is musculoskeletal.  He denies any recent infections or heavy lifting or trauma.  Pain is not reproducible on palpation.  Cardiology consulted  EKG was slightly abnormal with mild elevations in V3 and V4.  Cardiology reports this is not a STEMI.  Admit to telemetry  Trend troponin  Echocardiogram  Stress test    Abnormal EKG  Assessment & Plan  Patient with abnormal EKG.  Cardiology is consulted.  This is not a STEMI per cardiology.  Patient is showing mild ST elevations in V3 and V4 which are noncontiguous.  This is likely due to patient's athleticism.  Stress test  Echocardiogram

## 2021-06-08 NOTE — ASSESSMENT & PLAN NOTE
Patient with abnormal EKG.  Cardiology is consulted.  This is not a STEMI per cardiology.  Patient is showing mild ST elevations in V3 and V4 which are noncontiguous.  This is likely due to patient's athleticism.  Stress test  Echocardiogram

## 2021-06-08 NOTE — ED NOTES
Just made aware that pt's insurance has changed and that pt needs to be sent to [facility] for further care and treatment of CP and abnormal EKG  Pt will be transferred via [facility] when room and accepting MD has been arranged  Pt and wife aware

## 2021-06-08 NOTE — ED NOTES
Patient is leaving AMA at this time. Written and verbal instructions given to patient. Patient acknowledges and reports understanding of instructions.  Patient agrees to return to ER if symptoms worsen, and will follow-up with his PCP tomorrow otherwise.

## 2021-06-08 NOTE — ED PROVIDER NOTES
"ED Provider Note    CHIEF COMPLAINT  Chief Complaint   Patient presents with   • Chest Pain     \"abnormal EKG\"       HPI  Kush Jin is a 41 y.o. male who presents to the emergency department for evaluation of an abnormal EKG.  The patient's been having chest pain for last couple of days.  Chest pain is in the center of his chest and his throat and it radiates towards his back and his neck.  It is worsened by taking a deep breath.  The pain is also worsened by laying supine and improved by standing upright.  Chest pain is not exertional.  He has no associated nausea or vomiting or diaphoresis.  Denies any vomiting retching or tearing pain.  Is not had a fever or chills.  No recent sore throat cough or cold symptoms.  Denies any other aggravating relieving factors or associated complaints.  He was in the urgent care and had an abnormal EKG and was sent here for further work-up and treatment.    Denies any cough, fevers or chills or hemoptysis.  No history of PE or DVT.  No travel or immobilization.  Denies any personal history of CAD.  No family history of CAD, no high blood pressure diabetes high cholesterol or tobacco or sympathomimetic abuse.      REVIEW OF SYSTEMS  See HPI for further details. All other systems are negative.    PAST MEDICAL HISTORY  No past medical history on file.    FAMILY HISTORY  No family history on file.    SOCIAL HISTORY  Social History     Socioeconomic History   • Marital status:      Spouse name: Not on file   • Number of children: Not on file   • Years of education: Not on file   • Highest education level: Not on file   Occupational History   • Not on file   Tobacco Use   • Smoking status: Never Smoker   • Smokeless tobacco: Never Used   Vaping Use   • Vaping Use: Never used   Substance and Sexual Activity   • Alcohol use: Not Currently   • Drug use: Not Currently   • Sexual activity: Not on file   Other Topics Concern   • Not on file   Social History Narrative   • " "Not on file     Social Determinants of Health     Financial Resource Strain:    • Difficulty of Paying Living Expenses:    Food Insecurity:    • Worried About Running Out of Food in the Last Year:    • Ran Out of Food in the Last Year:    Transportation Needs:    • Lack of Transportation (Medical):    • Lack of Transportation (Non-Medical):    Physical Activity:    • Days of Exercise per Week:    • Minutes of Exercise per Session:    Stress:    • Feeling of Stress :    Social Connections:    • Frequency of Communication with Friends and Family:    • Frequency of Social Gatherings with Friends and Family:    • Attends [employer] Services:    • Active Member of Clubs or Organizations:    • Attends Club or Organization Meetings:    • Marital Status:    Intimate Partner Violence:    • Fear of Current or Ex-Partner:    • Emotionally Abused:    • Physically Abused:    • Sexually Abused:        SURGICAL HISTORY  No past surgical history on file.    CURRENT MEDICATIONS  Home Medications    **Home medications have not yet been reviewed for this encounter**         ALLERGIES  No Known Allergies    PHYSICAL EXAM  VITAL SIGNS: /85   Pulse 94   Temp 37.2 °C (98.9 °F) (Temporal)   Resp 16   Ht 1.753 m (5' 9\")   SpO2 96%   BMI 21.27 kg/m²      Constitutional: Awake alert nontoxic no acute distress  HENT: Normocephalic, Atraumatic, Bilateral external ears normal, Oropharynx moist, No oral exudates, Nose normal.   Eyes: PERRL, EOMI, Conjunctiva normal, No discharge.   Neck: Normal range of motion  Cardiovascular: Normal heart rate, Normal rhythm, No murmurs, No rubs, No gallops.   Thorax & Lungs: Normal breath sounds, No respiratory distress, No wheezing  Abdomen: Bowel sounds normal, Soft, No tenderness,  Skin: Warm, Dry, No erythema, No rash.   Back: No tenderness, No CVA tenderness.   Musculoskeletal: Good range of motion in all major joints.  No asymmetric edema good pulses.  Neurologic: Alert, No focal deficits " noted.   Psychiatric: Affect normal      Results for orders placed or performed during the hospital encounter of 06/07/21   CBC with Differential   Result Value Ref Range    WBC 10.0 4.8 - 10.8 K/uL    RBC 5.17 4.70 - 6.10 M/uL    Hemoglobin 16.0 14.0 - 18.0 g/dL    Hematocrit 46.2 42.0 - 52.0 %    MCV 89.4 81.4 - 97.8 fL    MCH 30.9 27.0 - 33.0 pg    MCHC 34.6 33.7 - 35.3 g/dL    RDW 37.0 35.9 - 50.0 fL    Platelet Count 205 164 - 446 K/uL    MPV 9.7 9.0 - 12.9 fL    Neutrophils-Polys 72.30 (H) 44.00 - 72.00 %    Lymphocytes 15.00 (L) 22.00 - 41.00 %    Monocytes 11.50 0.00 - 13.40 %    Eosinophils 0.60 0.00 - 6.90 %    Basophils 0.30 0.00 - 1.80 %    Immature Granulocytes 0.30 0.00 - 0.90 %    Nucleated RBC 0.00 /100 WBC    Neutrophils (Absolute) 7.24 1.82 - 7.42 K/uL    Lymphs (Absolute) 1.50 1.00 - 4.80 K/uL    Monos (Absolute) 1.15 (H) 0.00 - 0.85 K/uL    Eos (Absolute) 0.06 0.00 - 0.51 K/uL    Baso (Absolute) 0.03 0.00 - 0.12 K/uL    Immature Granulocytes (abs) 0.03 0.00 - 0.11 K/uL    NRBC (Absolute) 0.00 K/uL   Complete Metabolic Panel (CMP)   Result Value Ref Range    Sodium 138 135 - 145 mmol/L    Potassium 3.9 3.6 - 5.5 mmol/L    Chloride 102 96 - 112 mmol/L    Co2 27 20 - 33 mmol/L    Anion Gap 9.0 7.0 - 16.0    Glucose 102 (H) 65 - 99 mg/dL    Bun 17 8 - 22 mg/dL    Creatinine 0.93 0.50 - 1.40 mg/dL    Calcium 9.0 8.4 - 10.2 mg/dL    AST(SGOT) 14 12 - 45 U/L    ALT(SGPT) 13 2 - 50 U/L    Alkaline Phosphatase 112 (H) 30 - 99 U/L    Total Bilirubin 0.4 0.1 - 1.5 mg/dL    Albumin 4.5 3.2 - 4.9 g/dL    Total Protein 7.3 6.0 - 8.2 g/dL    Globulin 2.8 1.9 - 3.5 g/dL    A-G Ratio 1.6 g/dL   Troponin   Result Value Ref Range    Troponin T <6 6 - 19 ng/L   D-DIMER   Result Value Ref Range    D-Dimer Screen <0.27 0.00 - 0.50 ug/mL (FEU)   ESTIMATED GFR   Result Value Ref Range    GFR If African American >60 >60 mL/min/1.73 m 2    GFR If Non African American >60 >60 mL/min/1.73 m 2   COV-2, FLU A/B, AND RSV BY  PCR (2-4 HOURS CEPHEID): Collect NP swab in VTM    Specimen: Respirate   Result Value Ref Range    SARS-CoV-2 Source NP Swab    EKG   Result Value Ref Range    Report       [facility].    Test Date:  2021  Pt Name:    LADI ANDERSON             Department: [facility]  MRN:        [id number]                      Room:  Gender:     Male                         Technician: HRR  :        1980                   Requested By:ER TRIAGE PROTOCOL  Order #:    [id number]                    Reading MD: TWYLA MONAHAN. AMD    Measurements  Intervals                                Axis  Rate:       84                           P:          21  NC:         177                          QRS:        52  QRSD:       137                          T:          57  QT:         342  QTc:        405    Interpretive Statements  Sinus rhythm  Nonspecific intraventricular conduction delay  Probable anterolateral infarct, acute  ST elevation, consider inferior injury  Compared to ECG 2020 13:26:07  Intraventricular conduction delay now present  Myocardial infarct finding now present  ST (T wave) deviation now present  Electronicall y Signed On 2021 18:48:54 PDT by TWYLA MONAHAN. AMD     EKG   Result Value Ref Range    Report       [facility].    Test Date:  2021  Pt Name:    LADI ANDERSON             Department: EDS  MRN:        [id number]                      [facility]  Gender:     Male                         Technician: [id number]  :        1980                   Requested By:TWYLA MONAHAN  Order #:    [id number]                    Reading MD: TWYLA MONAHAN. AMD    Measurements  Intervals                                Axis  Rate:       88                           P:          15  NC:         188                          QRS:        25  QRSD:       78                           T:          35  QT:         334  QTc:         404    Interpretive Statements  Sinus rhythm  Anterior infarct, acute (LAD)  Minimal ST elevation, inferior leads  Lateral leads are also involved  Compared to ECG 06/07/2021 17:23:36  Intraventricular conduction delay no longer present  Myocardial infarct finding still present  ST (T wave) deviation still present  Electronical ly Signed On 6-7-2021 18:48:51 PDT by TWYLA MONAHAN. AMD        RADIOLOGY/PROCEDURES  DX-CHEST-PORTABLE (1 VIEW)   Final Result      No acute cardiopulmonary abnormality.      Cardiac Stress Test Treadmill without Images    (Results Pending)         COURSE & MEDICAL DECISION MAKING  Pertinent Labs & Imaging studies reviewed. (See chart for details)    The patient presents emerged part with chest pain.  The chest pain has been present for several days and constant with some mild pleuritic component or worsens.  Differential diagnosis includes but is not limited to ACS, pneumonia, pericarditis, malignancy, dissection, PE, pleurisy.    The patient was seen in urgent care and had abnormal EKG was sent here for further evaluation.  EKG here remains normal.  He has ST segment elevation but the morphology is not suggestive of a STEMI.  Repeat EKG is unchanged.    These 2 EKGs and the EKG in the urgent care are all similar however they are significantly different than his baseline EKG from March of last year.    He has ST segment elevation however he does not clinically have a history or exam or presentation consistent with a STEMI.  Discussed the case with Dr. Manuel Murrieta on-call for cardiology.  I think images abnormal with does not represent a STEMI.  He recommends trending troponins.  If they are negative he recommends hospitalization for stress test and echocardiogram.  He does not feel the patient requires transfer and he does not feel the EKG represents a STEMI.  Even if the troponin returns as elevated.    Patient has no pneumonia or pneumothorax.  His clinical history and exam and  mediastinum do not suggest a dissection.  His D-dimer is negative.  Chest x-ray is otherwise reassuring.  At this point pericarditis remains a differential diagnosis as it is ACS.  We hospitalist for further work-up attributable to a second troponin.  He has received aspirin and Toradol.  His pain is improved.  The case was discussed with the hospitalist the patient be hospitalized for further work-up and treatment.          FINAL IMPRESSION  1. Other chest pain     2. Abnormal EKG         Addendum: The patient was seen and evaluated the hospitalist admission orders were entered.  The patient was admitted for further work-up and treatment.  They made aware the patient is out of network.  We attempted to transfer him to multiple hospitals but has been no success.  The patient wanted to leave AMA.  The hospitalist was signed the patient out AMA.  I have called the ER  and left a message for them to make urgent cardiology referral for him.  Patient was signed out AGAINST MEDICAL ADVICE by the hospitalist.      Electronically signed by: Dean Pisano M.D., 6/7/2021 5:42 PM

## 2021-06-08 NOTE — ED NOTES
[facility] also has no telemetry beds available and has declined this patient due to no capacity at this time.  Charge RN discussing situation with patient.

## 2021-06-08 NOTE — PROGRESS NOTES
06/08/21  DIANNE Keller called patient to follow up from ED. Patient needs a cardiology appointment. [facility] is not taking [facility] at this time. [facility] cardiology is booked out until late July. DIANNE Keller called [facility] and they were closed for lunch. DIANNE Keller reached out to DIANNE Ferro to help follow up with patient.

## 2021-06-08 NOTE — PROGRESS NOTES
Subjective:   Kush Jin is a 41 y.o. male who presents for Chest Pain (x 2 days, chest pain, unable to get a deep breath, specially at nights )       Chest Pain   This is a new problem. The current episode started in the past 7 days. The onset quality is sudden. The problem occurs constantly. The problem has been gradually worsening. The pain is present in the substernal region. The pain is moderate. The quality of the pain is described as dull, heavy and pressure. The pain radiates to the left shoulder. Associated symptoms include shortness of breath. Pertinent negatives include no abdominal pain, cough, dizziness, fever, headaches, malaise/fatigue, nausea, orthopnea, vomiting or weakness. He has tried nothing for the symptoms. Risk factors include male gender.     Pt presents for evaluation of a new problem, reports substernal chest pain over the past 2 days which has increased in severity and is radiating up into his left shoulder.  Additionally, patient states that he is unable to take a full breath, and has pain with deep breathing.  Patient states that it is becoming increasingly worse throughout today.  Denies fever, chills, cough, or lower extremity edema.  Denies any previous cardiac history.    Review of Systems   Constitutional: Negative for chills, fever and malaise/fatigue.   HENT: Negative for congestion and sore throat.    Eyes: Negative for pain.   Respiratory: Positive for shortness of breath. Negative for cough and wheezing.    Cardiovascular: Positive for chest pain. Negative for orthopnea and leg swelling.   Gastrointestinal: Negative for abdominal pain, nausea and vomiting.   Genitourinary: Negative for dysuria.   Musculoskeletal: Negative for myalgias.   Skin: Negative for rash.   Neurological: Negative for dizziness, weakness and headaches.   Psychiatric/Behavioral: The patient is not nervous/anxious.    All other systems reviewed and are negative.      MEDS:   Current Outpatient  "Medications:   •  ibuprofen (MOTRIN) 200 MG Tab, Take 200 mg by mouth every 6 hours as needed., Disp: , Rfl:   ALLERGIES: No Known Allergies    Patient's PMH, SocHx, SurgHx, FamHx, Drug allergies and medications were reviewed.     Objective:   /70 (BP Location: Left arm, Patient Position: Sitting, BP Cuff Size: Adult)   Pulse (!) 104   Temp 36.7 °C (98 °F) (Temporal)   Resp 20   Ht 1.753 m (5' 9\")   Wt 65.3 kg (144 lb)   SpO2 93%   BMI 21.27 kg/m²     Physical Exam  Vitals and nursing note reviewed.   Constitutional:       General: He is awake.      Appearance: Normal appearance. He is well-developed and normal weight.   HENT:      Head: Normocephalic and atraumatic.      Right Ear: Tympanic membrane, ear canal and external ear normal.      Left Ear: Tympanic membrane, ear canal and external ear normal.      Nose: Nose normal.      Mouth/Throat:      Lips: Pink.      Mouth: Mucous membranes are moist.      Pharynx: Oropharynx is clear. Uvula midline.   Eyes:      Extraocular Movements: Extraocular movements intact.      Conjunctiva/sclera: Conjunctivae normal.      Pupils: Pupils are equal, round, and reactive to light.   Neck:      Thyroid: No thyromegaly.      Trachea: Trachea normal.   Cardiovascular:      Rate and Rhythm: Normal rate and regular rhythm.      Pulses: Normal pulses.      Heart sounds: S1 normal and S2 normal. Heart sounds are distant.   Pulmonary:      Effort: Pulmonary effort is normal. No respiratory distress.      Breath sounds: Normal breath sounds. No wheezing, rhonchi or rales.   Abdominal:      General: Bowel sounds are normal.      Palpations: Abdomen is soft.   Musculoskeletal:         General: Normal range of motion.      Cervical back: Full passive range of motion without pain, normal range of motion and neck supple.      Right lower leg: No edema.      Left lower leg: No edema.   Lymphadenopathy:      Cervical: No cervical adenopathy.   Skin:     General: Skin is warm and " dry.      Capillary Refill: Capillary refill takes less than 2 seconds.   Neurological:      General: No focal deficit present.      Mental Status: He is alert and oriented to person, place, and time.      Gait: Gait is intact.   Psychiatric:         Attention and Perception: Attention and perception normal.         Mood and Affect: Mood normal.         Speech: Speech normal.         Behavior: Behavior normal. Behavior is cooperative.         Thought Content: Thought content normal.         Judgment: Judgment normal.         Assessment/Plan:   Assessment    1. Abnormal EKG    2. ST elevation    3. Shortness of breath    4. Other chest pain    5. Chest pain on breathing    Vital signs stable at today's acute urgent care visit.  Reviewed test results that were completed in the clinic, EKG show diffuse ST elevation.  This is significantly different than his prior EKG from 3/2020.  Due to this and patient reports of worsening symptoms, he is advised to go to the ED for further evaluation and higher level of care.   All questions were encouraged and answered to the patient's satisfaction and understanding, and they agree to the plan of care. The transfer center was called and report provided.    I personally reviewed prior external notes and test results pertinent to today's visit.  I have independently reviewed and interpreted all diagnostics ordered during this urgent care acute visit. Time spent evaluating this patient was a minimum of 30 minutes and includes preparing for visit, counseling/education, exam, evaluation, obtaining history, and ordering lab/test/procedures.      Please note that this dictation was created using voice recognition software. I have made a reasonable attempt to correct obvious errors, but I expect that there are errors of grammar and possibly content that I did not discover before finalizing the note.